# Patient Record
(demographics unavailable — no encounter records)

---

## 2024-10-22 NOTE — PHYSICAL EXAM
[JVD] : no jugular venous distention  [Normal Breath Sounds] : Normal breath sounds [Normal Rate and Rhythm] : normal rate and rhythm [2+] : left 2+ [Ankle Swelling (On Exam)] : not present [Varicose Veins Of Lower Extremities] : present [Ankle Swelling Bilaterally] : severe [] : not present [Abdomen Tenderness] : ~T ~M No abdominal tenderness [No Rash or Lesion] : No rash or lesion [Alert] : alert [Calm] : calm [de-identified] : Appears well

## 2024-10-22 NOTE — HISTORY OF PRESENT ILLNESS
[FreeTextEntry1] : 64-year-old female with a previous history of morbid obesity status post gastric sleeve, diabetes, hypertension, ventricular tachycardia status post atrial and ventricular ablation presents with a long history of varicose veins bilaterally.  Patient states over the years they have become more numerous and have become more painful.  She has no history of DVT however, has had thrombophlebitis in the past.  She has worn compression stockings however, finds they do not provide relief of symptoms.  She presents to the office for evaluation.

## 2024-10-22 NOTE — ASSESSMENT
[FreeTextEntry1] : 64-year-old female with multiple medical problems now with large painful varicosities bilaterally.   Patient has intact pulses in both legs without evidence of arterial insufficiency.  Duplex demonstrates severe venous insufficiency of bilateral GSV   Patient with severe venous insufficiency that is now interfering with her daily life and refractory to conservative management. Treatment is indicated now for non-cosmetic reasons for symptomatic venous reflux disease. Recommend radiofrequency ablation of the (left) greater saphenous vein with stab phlebectomy.  The risks and benefits of endovenous ablation of saphenous vein versus conservative treatment was discussed with the patient. Patient chooses to proceed with the procedure. Treatment plan to be scheduled.

## 2024-11-20 NOTE — ASSESSMENT
[FreeTextEntry1] : 64-year-old female with multiple medical problems now with large painful varicosities bilaterally.   Patient has intact pulses in both legs without evidence of arterial insufficiency.  Duplex demonstrates severe venous insufficiency of bilateral GSV   Patient with severe venous insufficiency that is now interfering with her daily life and refractory to conservative management. Treatment is indicated now for non-cosmetic reasons for symptomatic venous reflux disease. Recommend radiofrequency ablation of the (left) greater saphenous vein with stab phlebectomy.

## 2024-11-20 NOTE — PROCEDURE
[FreeTextEntry1] : left leg radiofrequency ablation/phlebectomy [D/C IV on discharge] : D/C IV on discharge [Resume diet] : resume diet [Dressing checked for bleeding] : Dressing checked for bleeding [Vital signs on admission the q 15 mins x2] : Vital signs on admission the q 15 mins x2

## 2024-11-20 NOTE — PAST MEDICAL HISTORY
[Increasing age ( >40 years old)] : Increasing age ( >40 years old) [Varicose Veins] : Varicose Veins [Previous history of DVT or PE] : Previous history of DVT or PE

## 2024-12-02 NOTE — REASON FOR VISIT
[de-identified] : Ablation left greater saphenous vein with phlebectomy [de-identified] : 64-year-old female status post left venous ablation with phlebectomy.  Doing well.  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  No need for intervention at this time.  Follow-up 1 month.

## 2024-12-24 NOTE — ASSESSMENT
[FreeTextEntry1] : 64-year-old female with multiple medical problems now with large painful varicosities bilaterally.   Patient has intact pulses in both legs without evidence of arterial insufficiency.  Duplex demonstrates severe venous insufficiency of right GSV  Patient with severe venous insufficiency that is now interfering with her daily life and refractory to conservative management. Treatment is indicated now for non-cosmetic reasons for symptomatic venous reflux disease. Recommend radiofrequency ablation of the right greater saphenous vein with stab phlebectomy.  The risks and benefits of endovenous ablation of saphenous vein versus conservative treatment was discussed with the patient. Patient chooses to proceed with the procedure. Treatment plan to be scheduled. [Arterial/Venous Disease] : arterial/venous disease

## 2024-12-24 NOTE — PHYSICAL EXAM
[JVD] : no jugular venous distention  [Normal Breath Sounds] : Normal breath sounds [Normal Rate and Rhythm] : normal rate and rhythm [2+] : left 2+ [Ankle Swelling (On Exam)] : not present [Varicose Veins Of Lower Extremities] : present [Ankle Swelling Bilaterally] : severe [] : not present [Abdomen Tenderness] : ~T ~M No abdominal tenderness [No Rash or Lesion] : No rash or lesion [Alert] : alert [Calm] : calm [de-identified] : Appears well

## 2025-02-24 NOTE — PROCEDURE
[D/C IV on discharge] : D/C IV on discharge [Resume diet] : resume diet [Dressing checked for bleeding] : Dressing checked for bleeding [Vital signs on admission the q 15 mins x2] : Vital signs on admission the q 15 mins x2 [FreeTextEntry1] : right leg radiofrequency ablation/phlebectomy

## 2025-02-24 NOTE — PAST MEDICAL HISTORY
[Increasing age ( >40 years old)] : Increasing age ( >40 years old) [Varicose Veins] : Varicose Veins [No therapy indicated for cases scheduled for less than one hour] : No therapy indicated for cases scheduled for less than one hour. [Previous history of DVT or PE] : Previous history of DVT or PE [FreeTextEntry1] : Malignant Hyperthermia Screening Tool and Risk of Bleeding Assessment  Ms. MAGALY WEST denies family history of unexpected death following Anesthesia or Exercise. Denies Family history of Malignant Hyperthermia, Muscle or Neuromuscular disorder and High Temperature following exercise.  Ms. MAGALY WEST denies history of Muscle Spasm, Dark or Chocolate - Colored urine and Unanticipated fever immediately following anesthesia or serious exercise.  Ms. WEST also denies bleeding tendencies/ Risks of Bleeding.

## 2025-02-24 NOTE — HISTORY OF PRESENT ILLNESS
[FreeTextEntry1] : accompanied by cousin Penny [phone] feels ok took aldactone, flecainide, furosemide, potassium and verapamil this morning [FreeTextEntry5] : 500 am [FreeTextEntry6] : Dr. Churchill

## 2025-02-24 NOTE — ASSESSMENT
[FreeTextEntry1] : 64-year-old female with multiple medical problems now with large painful varicosities bilaterally.   Patient has intact pulses in both legs without evidence of arterial insufficiency.  Duplex demonstrates severe venous insufficiency of bilateral GSV   Patient with severe venous insufficiency that is now interfering with her daily life and refractory to conservative management. Treatment is indicated now for non-cosmetic reasons for symptomatic venous reflux disease. plan for radiofrequency ablation of the right greater saphenous vein with stab phlebectomy.

## 2025-02-24 NOTE — HISTORY OF PRESENT ILLNESS
[FreeTextEntry1] : accompanied by cousin Penny [phone] feels ok took aldactone, flecainide, furosemide, potassium and verapamil this morning [FreeTextEntry5] : 500 am [FreeTextEntry6] : Dr. Churchill

## 2025-03-25 NOTE — ASSESSMENT
[FreeTextEntry1] : 64-year-old female with multiple medical problems now with large painful varicosities bilaterally.   Patient is now status post bilateral ablations with phlebectomy.  She does have a small amount of scattered varicosities bilaterally.  Symptomatically she is much improved.  She is also noted less swelling.  At this time no urgent intervention is necessary.  He will continue with observation.  She will return to the office in 4 to 6 months   If she would like to be further evaluated follow-up [Arterial/Venous Disease] : arterial/venous disease

## 2025-03-25 NOTE — HISTORY OF PRESENT ILLNESS
[FreeTextEntry1] : 64-year-old female with a previous history of morbid obesity status post gastric sleeve, diabetes, hypertension, ventricular tachycardia status post atrial and ventricular ablation presents with a long history of varicose veins bilaterally.  Patient states over the years they have become more numerous and have become more painful.  She has no history of DVT however, has had thrombophlebitis in the past.  She has worn compression stockings however, finds they do not provide relief of symptoms.  She is now status post bilateral ablations.  She has noted significant improvement in the symptoms of her lower extremities and in the appearance.

## 2025-03-25 NOTE — PHYSICAL EXAM
[JVD] : no jugular venous distention  [Normal Breath Sounds] : Normal breath sounds [Normal Rate and Rhythm] : normal rate and rhythm [2+] : left 2+ [Ankle Swelling (On Exam)] : not present [Varicose Veins Of Lower Extremities] : present [Ankle Swelling Bilaterally] : severe [] : not present [Abdomen Tenderness] : ~T ~M No abdominal tenderness [No Rash or Lesion] : No rash or lesion [Alert] : alert [Calm] : calm [de-identified] : Appears well

## 2025-03-27 NOTE — DISCUSSION/SUMMARY
[EKG obtained to assist in diagnosis and management of assessed problem(s)] : EKG obtained to assist in diagnosis and management of assessed problem(s) [FreeTextEntry1] : EKG today shows:  Sinus Rhythm at 71 bpm.  Normal intervals and axis.  Borderline low voltage; no significant change.  PLAN: 1.  Remote hx. of RVOT tachycardia s/p ablation      PAF, s/p cardioversion x 2 and then ablation in Mar. 2022.  At that time, ablation of recurrent VPCs also performed. -   Reema will continue flecainide and verapamil.  2.  HTN - BP remains controlled.   - Continue Aldactone, Lasix and verapamil.  3.  Hx. recurrent saphenous vein thrombosis (had prior episode in April of 2012; hypercoagulable workup by Dr. Alcocer was negative at that time). -  Long-term A/C has been d/c'd -  continue f/u with Dr. Melendez.  31 minutes spent on today's office visit.   She will return in 6 months.  Will arrange TTE at that visit.

## 2025-03-27 NOTE — REASON FOR VISIT
[FreeTextEntry1] :   Reema Rangel returns for f/u of palpitations and HTN.  She has a hx. of PAF and ventricular arrhythmia, s/p remote ablation of RVOT tachycardia and f/u ablation in March 2022 for AF and VPCs.

## 2025-03-27 NOTE — ASSESSMENT
[FreeTextEntry1] : ================================ Palpitations.   - Hx. of RV outflow tract ventricular tachycardia, s/p ablation by Dr. Doe in 2005.   - Also, hx. of PAF, s/p cardioversion by Dr. Doe 4/2016 and 8/2017; a/p ablation by Dr. Burdick 3/2022  - symptomatic VPCs, also ablated 3/2022 by Dr. Burdick - continues on flecainide and verapamil.  Hypertension .  No evidence of coronary artery disease seen on CT angiogram on December 5, 2011.  History of recurrent saphenous vein thrombosis (had prior episode in April of 2012; hypercoagulable workup by Dr. Alcocer was negative at that time); remains on Coumadin. Lower extremity varicosities  Asthma

## 2025-03-27 NOTE — HISTORY OF PRESENT ILLNESS
[FreeTextEntry1] : As I see her today, she remains well from a cardiac standpoint.  She describes only rare palps; there have been no episodes of sustained arrhythmia.  She reports no episodes of exertional chest discomfort.  Her respiratory status remains stable.  She describes no episodes of orthopnea or PND.   She tolerated recent treatment by Dr. Melendez for varicose veins with good result.

## 2025-04-21 NOTE — HISTORY OF PRESENT ILLNESS
[Patient reported mammogram was normal] : Patient reported mammogram was normal [Patient reported breast sonogram was normal] : Patient reported breast sonogram was normal [Patient reported colonoscopy was normal] : Patient reported colonoscopy was normal [Difficulty with [place]] : difficulty with intercourse [Options Discussed] : options discussed [Vaginal Lubrication] : vaginal lubrication [No] : Patient does not have concerns regarding sex [Currently Active] : currently active [Y] : Patient is sexually active [N] : Patient denies prior pregnancies [FreeTextEntry1] : 65 yo  presents annual w/o complaints.  H/o DC  for bleeding [Mammogramdate] : 2023 [BreastSonogramDate] : 2023 [PapSmeardate] : 2023 [BoneDensityDate] : 2025 [TextBox_37] : osteopenia [ColonoscopyDate] : 2022

## 2025-04-21 NOTE — COUNSELING
[Nutrition/ Exercise/ Weight Management] : nutrition, exercise, weight management [Vitamins/Supplements] : vitamins/supplements [Breast Self Exam] : breast self exam [Bladder Hygiene] : bladder hygiene [FreeTextEntry2] : OTC and RX vaginal moisturizers and medications

## 2025-04-21 NOTE — PHYSICAL EXAM
[Appropriately responsive] : appropriately responsive [Alert] : alert [No Acute Distress] : no acute distress [No Lymphadenopathy] : no lymphadenopathy [Soft] : soft [Non-tender] : non-tender [Non-distended] : non-distended [No HSM] : No HSM [No Lesions] : no lesions [No Mass] : no mass [Oriented x3] : oriented x3 [Examination Of The Breasts] : a normal appearance [No Discharge] : no discharge [No Masses] : no breast masses were palpable [Labia Majora] : normal [Labia Minora] : normal [No Bleeding] : There was no active vaginal bleeding [Normal] : normal [Uterine Adnexae] : normal [Atrophy] : atrophy

## 2025-04-21 NOTE — HISTORY OF PRESENT ILLNESS
[Patient reported mammogram was normal] : Patient reported mammogram was normal [Patient reported breast sonogram was normal] : Patient reported breast sonogram was normal [Patient reported colonoscopy was normal] : Patient reported colonoscopy was normal [Difficulty with [place]] : difficulty with intercourse [Options Discussed] : options discussed [Vaginal Lubrication] : vaginal lubrication [No] : Patient does not have concerns regarding sex [Currently Active] : currently active [Y] : Patient is sexually active [N] : Patient denies prior pregnancies [FreeTextEntry1] : 65 yo  presents annual w/o complaints.  H/o DC  for bleeding [Mammogramdate] : 2023 [BreastSonogramDate] : 2023 [PapSmeardate] : 2023 [BoneDensityDate] : 2025 [TextBox_37] : osteopenia [ColonoscopyDate] : 2022

## 2025-05-21 NOTE — HISTORY OF PRESENT ILLNESS
[FreeTextEntry1] : 65 yo  presents for discussion of abnormal US showing irregular endometrial fluid, normal endometrial thickness.  Pt denies any h/o of PMB or abnormal vaginal discharge.  Pt currently undergoing treatments for skin cancer. [Patient reported PAP Smear was normal] : Patient reported PAP Smear was normal [PapSmeardate] : 04/25

## 2025-05-21 NOTE — HISTORY OF PRESENT ILLNESS
[FreeTextEntry1] : 63 yo  presents for discussion of abnormal US showing irregular endometrial fluid, normal endometrial thickness.  Pt denies any h/o of PMB or abnormal vaginal discharge.  Pt currently undergoing treatments for skin cancer. [Patient reported PAP Smear was normal] : Patient reported PAP Smear was normal [PapSmeardate] : 04/25

## 2025-05-21 NOTE — DISCUSSION/SUMMARY
[FreeTextEntry1] : Recommend endometrial sampling, risks, benefits, possible complications of procedure, and alternatives  reviewed.  Pt no h/o of pregnancy-  Cytotec givenI spent the time noted on the day of this patient encounter preparing for, providing and documenting the above service. I have counseled and educated the patient on the differential, workup, disease course, and treatment/management plan. Education was provided to the patient during this encounter. All questions and concerns were answered and addressed in detail.

## 2025-06-18 NOTE — PROCEDURE
[Endometrial Biopsy] : Endometrial biopsy [Time out performed] : Pre-procedure time out performed.  Patient's name, date of birth and procedure confirmed. [Consent Obtained] : Consent obtained [Pre-op Evaluation] : Pre-op evaluation [Risks] : risks [Benefits] : benefits [Alternatives] : alternatives [Patient] : patient [Infection] : infection [Bleeding] : bleeding [Allergic Reaction] : allergic reaction [Uterine Perforation] : uterine perforation [Pain] : pain [N/A] : pregnancy test not applicable [Cytotec] : Cytotec [Betadine] : Betadine [0.01] : 0.01 [Tenaculum] : Tenaculum [Required Dilation] : required dilation [Sounded to ___ cm] : sounded to [unfilled] ~Ucm [Mid Position] : mid position [Scant] : scant [Specimen Collected] : collected [Sent to Pathology] : placed in buffered formalin and sent for pathology [ECC] : Endocervical curettage was also performed [Tolerated Well] : Patient tolerated the procedure well [No Complications] : No complications [de-identified] : abnormal endometrial fluid in the cavity [LMPDate] : age 50 [de-identified] : curette, tenaculum, [place] [de-identified] : Instructions provided